# Patient Record
Sex: FEMALE | Race: BLACK OR AFRICAN AMERICAN | Employment: OTHER | URBAN - METROPOLITAN AREA
[De-identification: names, ages, dates, MRNs, and addresses within clinical notes are randomized per-mention and may not be internally consistent; named-entity substitution may affect disease eponyms.]

---

## 2022-03-18 PROBLEM — D64.9 ANEMIA: Status: ACTIVE | Noted: 2021-09-23

## 2022-03-18 PROBLEM — E72.11 HOMOCYSTINURIA (HCC): Status: ACTIVE | Noted: 2021-05-14

## 2022-03-18 PROBLEM — M32.14 SLE GLOMERULONEPHRITIS SYNDROME (HCC): Status: ACTIVE | Noted: 2021-07-22

## 2022-03-18 PROBLEM — J43.9 BULLA OF LUNG (HCC): Status: ACTIVE | Noted: 2021-09-23

## 2022-03-18 PROBLEM — T82.838A BLEEDING FROM DIALYSIS SHUNT (HCC): Status: ACTIVE | Noted: 2021-09-23

## 2022-03-19 PROBLEM — Z99.2 DEPENDENCE ON RENAL DIALYSIS (HCC): Status: ACTIVE | Noted: 2021-07-22

## 2022-03-19 PROBLEM — R31.0 GROSS HEMATURIA: Status: ACTIVE | Noted: 2021-07-22

## 2022-03-19 PROBLEM — I12.9 HYPERTENSIVE CHRONIC KIDNEY DISEASE WITH STAGE 1 THROUGH STAGE 4 CHRONIC KIDNEY DISEASE, OR UNSPECIFIED CHRONIC KIDNEY DISEASE: Status: ACTIVE | Noted: 2021-09-23

## 2022-03-19 PROBLEM — T41.45XA ADVERSE EFFECT OF ANESTHESIA: Status: ACTIVE | Noted: 2021-09-23

## 2022-03-19 PROBLEM — I10 HYPERTENSION: Status: ACTIVE | Noted: 2021-09-23

## 2022-03-19 PROBLEM — N39.0 URINARY TRACT INFECTION, SITE NOT SPECIFIED: Status: ACTIVE | Noted: 2021-09-23

## 2022-03-19 PROBLEM — Z94.0 RENAL TRANSPLANT RECIPIENT: Status: ACTIVE | Noted: 2018-04-20

## 2022-03-20 PROBLEM — J10.08: Status: ACTIVE | Noted: 2021-09-23

## 2022-03-20 PROBLEM — H04.129 TEAR FILM INSUFFICIENCY: Status: ACTIVE | Noted: 2017-02-01

## 2024-03-01 ENCOUNTER — OFFICE VISIT (OUTPATIENT)
Age: 54
End: 2024-03-01
Payer: MEDICARE

## 2024-03-01 VITALS
BODY MASS INDEX: 24.2 KG/M2 | HEART RATE: 68 BPM | SYSTOLIC BLOOD PRESSURE: 120 MMHG | TEMPERATURE: 98.2 F | HEIGHT: 69 IN | OXYGEN SATURATION: 100 % | RESPIRATION RATE: 16 BRPM | DIASTOLIC BLOOD PRESSURE: 74 MMHG | WEIGHT: 163.4 LBS

## 2024-03-01 DIAGNOSIS — I10 PRIMARY HYPERTENSION: Primary | ICD-10-CM

## 2024-03-01 DIAGNOSIS — I50.42 CHRONIC COMBINED SYSTOLIC AND DIASTOLIC CHF (CONGESTIVE HEART FAILURE) (HCC): ICD-10-CM

## 2024-03-01 DIAGNOSIS — R01.1 SYSTOLIC MURMUR: ICD-10-CM

## 2024-03-01 DIAGNOSIS — I47.19 OTHER SUPRAVENTRICULAR TACHYCARDIA: ICD-10-CM

## 2024-03-01 DIAGNOSIS — R06.02 EXERTIONAL SHORTNESS OF BREATH: ICD-10-CM

## 2024-03-01 DIAGNOSIS — Z94.0 RENAL TRANSPLANT, STATUS POST: ICD-10-CM

## 2024-03-01 DIAGNOSIS — R94.31 ABNORMAL ECG: ICD-10-CM

## 2024-03-01 DIAGNOSIS — M32.9 SLE (SYSTEMIC LUPUS ERYTHEMATOSUS RELATED SYNDROME) (HCC): ICD-10-CM

## 2024-03-01 PROCEDURE — 99214 OFFICE O/P EST MOD 30 MIN: CPT | Performed by: INTERNAL MEDICINE

## 2024-03-01 PROCEDURE — 3074F SYST BP LT 130 MM HG: CPT | Performed by: INTERNAL MEDICINE

## 2024-03-01 PROCEDURE — G8420 CALC BMI NORM PARAMETERS: HCPCS | Performed by: INTERNAL MEDICINE

## 2024-03-01 PROCEDURE — G8484 FLU IMMUNIZE NO ADMIN: HCPCS | Performed by: INTERNAL MEDICINE

## 2024-03-01 PROCEDURE — G8427 DOCREV CUR MEDS BY ELIG CLIN: HCPCS | Performed by: INTERNAL MEDICINE

## 2024-03-01 PROCEDURE — 1036F TOBACCO NON-USER: CPT | Performed by: INTERNAL MEDICINE

## 2024-03-01 PROCEDURE — 93000 ELECTROCARDIOGRAM COMPLETE: CPT | Performed by: INTERNAL MEDICINE

## 2024-03-01 PROCEDURE — 3017F COLORECTAL CA SCREEN DOC REV: CPT | Performed by: INTERNAL MEDICINE

## 2024-03-01 PROCEDURE — 3078F DIAST BP <80 MM HG: CPT | Performed by: INTERNAL MEDICINE

## 2024-03-01 RX ORDER — OMEPRAZOLE 20 MG/1
20 CAPSULE, DELAYED RELEASE ORAL 2 TIMES DAILY
COMMUNITY

## 2024-03-01 RX ORDER — FAMOTIDINE 20 MG/1
20 TABLET, FILM COATED ORAL 2 TIMES DAILY
COMMUNITY
Start: 2023-07-29

## 2024-03-01 RX ORDER — ONDANSETRON 4 MG/1
4 TABLET, ORALLY DISINTEGRATING ORAL EVERY 8 HOURS PRN
COMMUNITY
Start: 2023-07-09

## 2024-03-01 RX ORDER — MYCOPHENOLIC ACID 180 MG/1
540 TABLET, DELAYED RELEASE ORAL 2 TIMES DAILY
COMMUNITY
Start: 2023-08-08

## 2024-03-01 RX ORDER — TACROLIMUS 4 MG/1
TABLET, EXTENDED RELEASE ORAL DAILY
COMMUNITY
Start: 2024-01-30

## 2024-03-01 RX ORDER — DIBASIC SODIUM PHOSPHATE, MONOBASIC POTASSIUM PHOSPHATE AND MONOBASIC SODIUM PHOSPHATE 852; 155; 130 MG/1; MG/1; MG/1
1 TABLET ORAL 2 TIMES DAILY
COMMUNITY

## 2024-03-01 RX ORDER — FLECAINIDE ACETATE 50 MG/1
50 TABLET ORAL 2 TIMES DAILY
COMMUNITY
Start: 2022-09-29

## 2024-03-01 RX ORDER — SENNOSIDES A AND B 8.6 MG/1
8.6 TABLET, FILM COATED ORAL PRN
COMMUNITY
Start: 2023-06-16 | End: 2024-06-15

## 2024-03-01 RX ORDER — LANOLIN ALCOHOL/MO/W.PET/CERES
400 CREAM (GRAM) TOPICAL DAILY
COMMUNITY
Start: 2024-01-11 | End: 2025-01-10

## 2024-03-01 RX ORDER — LOPERAMIDE HYDROCHLORIDE 2 MG/1
2 CAPSULE ORAL PRN
COMMUNITY

## 2024-03-01 ASSESSMENT — LIFESTYLE VARIABLES: HOW MANY STANDARD DRINKS CONTAINING ALCOHOL DO YOU HAVE ON A TYPICAL DAY: PATIENT DOES NOT DRINK

## 2024-03-01 NOTE — PROGRESS NOTES
Svetlana Duong (:  1970) is a 53 y.o. female,Established patient, here for evaluation of the following chief complaint(s):  Follow-up (7-Month F/U)      Subjective   SUBJECTIVE/OBJECTIVE:  HPI  Patient presents today for follow-up. She has a history of end-stage renal disease previously on hemodialysis. She had one failed kidney transplant but underwent a second one a year ago successfully. She feels much better with fair controlled blood pressures. Shortness of breath still persists, but much better than noted previously. She does have pulmonary hypertension and evidence of combined systolic and diastolic heart failure previously. Patient is doing relatively well without acute issue. No chest pain. No orthopnea. She is able to ambulate without restriction. She does have at least moderate cardiomegaly with an ejection fraction of 45-50%.           I have carefully reviewed all available medical records, previous office notes, lab, x-ray and procedure reports.    Past Medical History:   Diagnosis Date    COVID-19     Hypertension     Kidney failure     Lupus (HCC)     Mitral valve prolapse     Pulmonary hypertension (HCC)     Sleep apnea     SVT (supraventricular tachycardia)         Past Surgical History:   Procedure Laterality Date    KIDNEY TRANSPLANT  2016    KIDNEY TRANSPLANT Right 2023        Allergies   Allergen Reactions    Covid-19 (Mrna) Vaccine Other (See Comments)     Kidney transplant rejection/renal failure, HTN, sweating, tachycardia, headache    Sulfa Antibiotics Hives and Other (See Comments)    Penicillins Hives    Chlorhexidine Gluconate Other (See Comments)     CHG attached to tegaderm dressing causes rash    Sulfamethoxazole-Trimethoprim Itching    Chlorhexidine Rash     CHG attached to tegaderm dressing causes rash        Current Outpatient Medications   Medication Sig Dispense Refill    ENVARSUS XR 4 MG TB24 tablet Take by mouth daily 25 mg daily      senna (SENOKOT) 8.6 MG

## 2024-03-18 ASSESSMENT — ENCOUNTER SYMPTOMS: SHORTNESS OF BREATH: 1

## 2024-08-09 RX ORDER — BUMETANIDE 1 MG/1
1 TABLET ORAL DAILY
Qty: 60 TABLET | Refills: 0 | Status: SHIPPED | OUTPATIENT
Start: 2024-08-09

## 2024-08-09 NOTE — PROGRESS NOTES
Patient having dyspnea on exertion with fluid retention.  Recommend Bumex 1 mg taking 2 tabs on day 1 and day 2 then 1 tab daily thereafter    --Phillip Carrera MD

## 2024-09-23 RX ORDER — FLECAINIDE ACETATE 50 MG/1
50 TABLET ORAL 2 TIMES DAILY
Qty: 180 TABLET | Refills: 3 | Status: SHIPPED | OUTPATIENT
Start: 2024-09-23